# Patient Record
Sex: MALE | Race: WHITE | NOT HISPANIC OR LATINO | Employment: OTHER | URBAN - METROPOLITAN AREA
[De-identification: names, ages, dates, MRNs, and addresses within clinical notes are randomized per-mention and may not be internally consistent; named-entity substitution may affect disease eponyms.]

---

## 2018-07-05 RX ORDER — CHLORAL HYDRATE 500 MG
CAPSULE ORAL
COMMUNITY

## 2018-07-05 RX ORDER — ATORVASTATIN CALCIUM 40 MG/1
40 TABLET, FILM COATED ORAL DAILY
COMMUNITY
End: 2018-07-12

## 2018-07-05 RX ORDER — GABAPENTIN 300 MG/1
300 CAPSULE ORAL 3 TIMES DAILY
COMMUNITY

## 2018-07-05 RX ORDER — VALSARTAN 160 MG/1
160 TABLET ORAL DAILY
COMMUNITY
End: 2019-10-23

## 2018-07-05 RX ORDER — FENOFIBRATE 145 MG/1
145 TABLET, COATED ORAL DAILY
COMMUNITY

## 2018-07-05 RX ORDER — ATENOLOL 50 MG/1
50 TABLET ORAL DAILY
COMMUNITY

## 2018-07-12 ENCOUNTER — OFFICE VISIT (OUTPATIENT)
Dept: NEUROSURGERY | Facility: CLINIC | Age: 48
End: 2018-07-12

## 2018-07-12 VITALS
BODY MASS INDEX: 26.49 KG/M2 | WEIGHT: 213 LBS | TEMPERATURE: 98.6 F | SYSTOLIC BLOOD PRESSURE: 118 MMHG | HEIGHT: 75 IN | DIASTOLIC BLOOD PRESSURE: 64 MMHG

## 2018-07-12 DIAGNOSIS — R07.9 CHEST PAIN, UNSPECIFIED TYPE: ICD-10-CM

## 2018-07-12 DIAGNOSIS — M51.34 DDD (DEGENERATIVE DISC DISEASE), THORACIC: ICD-10-CM

## 2018-07-12 DIAGNOSIS — M50.20 HERNIATED CERVICAL DISC: ICD-10-CM

## 2018-07-12 DIAGNOSIS — G54.0 BRACHIAL PLEXOPATHY: Primary | ICD-10-CM

## 2018-07-12 DIAGNOSIS — M48.02 SPINAL STENOSIS IN CERVICAL REGION: ICD-10-CM

## 2018-07-12 DIAGNOSIS — M54.9 MID BACK PAIN: ICD-10-CM

## 2018-07-12 PROCEDURE — 99203 OFFICE O/P NEW LOW 30 MIN: CPT | Performed by: NEUROLOGICAL SURGERY

## 2018-07-12 NOTE — PROGRESS NOTES
NAME: NAY LEE   DOS: 2018  : 1970  PCP: Saulo Thao MD    Chief Complaint:  Left axillary pain chest pain  Chief Complaint   Patient presents with   • Arm Pain   • Neck Pain   • Chest Pain       History of Present Illness:  48 y.o. male   Is a 48-year-old diabetic male with a history of interesting left arm pain finger numbness as well as some left-sided anterior chest wall pain.  He denies any problems with weakness of his upper and lower extremities the pain is been ever present since at least May of this year where he got an MRI of the cervical spine and was apparently told that he needed surgery.  During his workup given the severity of his left chest wall pain his smoking status and diabetes he underwent a cardiac catheterization as well as workup for chest cancer these were reportedly unremarkable.  His arm pain has since abated he denies any weakness changes he occasionally got some popping in his neck but the majority of his pain centers around the T3 perhaps T2 dermatomal he's here for evaluation the  He was told by prednisone he's currently on Neurontin  PMHX  Allergies:  No Known Allergies  Medications    Current Outpatient Prescriptions:   •  atenolol (TENORMIN) 50 MG tablet, Take 50 mg by mouth Daily., Disp: , Rfl:   •  fenofibrate (TRICOR) 145 MG tablet, Take 145 mg by mouth Daily., Disp: , Rfl:   •  gabapentin (NEURONTIN) 300 MG capsule, Take 300 mg by mouth 3 (Three) Times a Day., Disp: , Rfl:   •  metFORMIN (GLUCOPHAGE) 1000 MG tablet, Take 1,000 mg by mouth 2 (Two) Times a Day With Meals., Disp: , Rfl:   •  Omega-3 Fatty Acids (FISH OIL) 1000 MG capsule capsule, Take  by mouth Daily With Breakfast., Disp: , Rfl:   •  SILDENAFIL CITRATE PO, Take 50 mg by mouth., Disp: , Rfl:   •  valsartan (DIOVAN) 160 MG tablet, Take 160 mg by mouth Daily., Disp: , Rfl:   Past Medical History:  Past Medical History:   Diagnosis Date   • Allergic rhinitis    • Anxiety    •  Diabetes mellitus (CMS/HCC)    • ED (erectile dysfunction)    • Hypertension    • Hypertriglyceridemia      Past Surgical History:  Past Surgical History:   Procedure Laterality Date   • HERNIA REPAIR       Social Hx:  Social History   Substance Use Topics   • Smoking status: Current Every Day Smoker     Types: Cigarettes   • Smokeless tobacco: Never Used   • Alcohol use No     Family Hx:  Family History   Problem Relation Age of Onset   • No Known Problems Mother    • No Known Problems Father      Review of Systems:        Review of Systems   Constitutional: Positive for fatigue. Negative for activity change, appetite change, chills, diaphoresis, fever and unexpected weight change.   HENT: Negative for congestion, dental problem, drooling, ear discharge, ear pain, facial swelling, hearing loss, mouth sores, nosebleeds, postnasal drip, rhinorrhea, sinus pressure, sneezing, sore throat, tinnitus, trouble swallowing and voice change.    Eyes: Negative for photophobia, pain, discharge, redness, itching and visual disturbance.   Respiratory: Negative for apnea, cough, choking, chest tightness, shortness of breath, wheezing and stridor.    Cardiovascular: Negative for chest pain, palpitations and leg swelling.   Gastrointestinal: Negative for abdominal distention, abdominal pain, anal bleeding, blood in stool, constipation, diarrhea, nausea, rectal pain and vomiting.   Endocrine: Negative for cold intolerance, heat intolerance, polydipsia, polyphagia and polyuria.   Genitourinary: Negative for decreased urine volume, difficulty urinating, dysuria, enuresis, flank pain, frequency, genital sores, hematuria and urgency.   Musculoskeletal: Positive for arthralgias, back pain, myalgias, neck pain and neck stiffness. Negative for gait problem and joint swelling.   Skin: Negative for color change, pallor, rash and wound.   Allergic/Immunologic: Negative for environmental allergies, food allergies and immunocompromised state.    Neurological: Negative for dizziness, tremors, seizures, syncope, facial asymmetry, speech difficulty, weakness, light-headedness, numbness and headaches.   Hematological: Negative for adenopathy. Does not bruise/bleed easily.   Psychiatric/Behavioral: Negative for agitation, behavioral problems, confusion, decreased concentration, dysphoric mood, hallucinations, self-injury, sleep disturbance and suicidal ideas. The patient is not nervous/anxious and is not hyperactive.     I have reviewed this note template and all pertinent parts of the review of systems social, family history, surgical history and medication list        Physical Examination:  Vitals:    07/12/18 1614   BP: 118/64   Temp: 98.6 °F (37 °C)      General Appearance:   Well developed, well nourished, well groomed, alert, and cooperative.  Neurological examination:  Neurologic Exam    Vital signs were reviewed and documented in the chart  Patient appeared in good neurologic function with normal comprehension fluent speech  Mood and affect are normal  Sense of smell deferred    Pupils symmetric equally reactive funduscopic exam not visualized   Visual fields intact to confrontation  Extraocular movements intact  Face motor function is symmetric  Facial sensations normal  Hearing intact to finger rub hearing intact to finger rub  Tongue is midline  Palate symmetric  Swallowing normal  Shoulder shrug normal    Muscle bulk and tone normal  5 out of 5 strength no motor drift  Gait normal intact  Negative Romberg  No clonus long tract signs or myelopathy    Reflexes symmetric  No edema noted and extremities skin appears normal    Straight leg raise sign absent  No signs of intrinsic hip dysfunction  Back is without any lesions or abnormality  Feet are warm and well perfused  He has no suspended thoracic sensory level he has no vibratory sensation issues    Review of Imaging/DATA:  His MRI of the cervical spine is relatively impressive he does have C5 6 C6  7 disc disease there is some cord contouring there is no evidence of signal change most of his disease is in the right intraforaminal area however there is left central disc bulge  Diagnoses/Plan:    Mr. Villalpando is a 48 y.o. male   The gentleman presents with mostly thoracic complaints of T2 perhaps T3 dermatomal distribution pain he denies any rashes he doesn't have any paraspinal tenderness in all of his radicular complaints are gone I think it's prudent to get a thoracic MRI it may be that he had had a shingle outbreak or something or perhaps this is atypical radicular preferred pain but it is unusual given its location nonetheless.  I don't necessarily think given the absence of myelopathy that he requires urgent decompression and I explained to him I cautioned him about fall risk etc. I think it's reasonable to monitor him for the next month or so and see him back with an MRI of the thoracic and repeat cervical spine his film is 3 months old.  We can contemplate an anterior cervical discectomy depending the results of this follow-up

## 2018-07-19 ENCOUNTER — APPOINTMENT (OUTPATIENT)
Dept: MRI IMAGING | Facility: HOSPITAL | Age: 48
End: 2018-07-19
Attending: NEUROLOGICAL SURGERY

## 2018-07-20 ENCOUNTER — HOSPITAL ENCOUNTER (OUTPATIENT)
Dept: MRI IMAGING | Facility: HOSPITAL | Age: 48
Discharge: HOME | End: 2018-07-20
Attending: NEUROLOGICAL SURGERY | Admitting: NEUROLOGICAL SURGERY

## 2018-07-20 ENCOUNTER — HOSPITAL ENCOUNTER (OUTPATIENT)
Dept: MRI IMAGING | Facility: HOSPITAL | Age: 48
Discharge: HOME | End: 2018-07-20
Attending: NEUROLOGICAL SURGERY

## 2018-07-20 DIAGNOSIS — M48.02 SPINAL STENOSIS IN CERVICAL REGION: ICD-10-CM

## 2018-07-20 DIAGNOSIS — M54.9 MID BACK PAIN: ICD-10-CM

## 2018-07-20 DIAGNOSIS — R07.9 CHEST PAIN, UNSPECIFIED TYPE: ICD-10-CM

## 2018-07-20 DIAGNOSIS — M51.34 DDD (DEGENERATIVE DISC DISEASE), THORACIC: ICD-10-CM

## 2018-07-20 DIAGNOSIS — M50.20 HERNIATED CERVICAL DISC: ICD-10-CM

## 2018-07-20 PROCEDURE — 72141 MRI NECK SPINE W/O DYE: CPT

## 2018-07-20 PROCEDURE — 72146 MRI CHEST SPINE W/O DYE: CPT

## 2018-07-23 ENCOUNTER — OFFICE VISIT (OUTPATIENT)
Dept: NEUROSURGERY | Facility: CLINIC | Age: 48
End: 2018-07-23

## 2018-07-23 VITALS — RESPIRATION RATE: 17 BRPM | OXYGEN SATURATION: 98 % | HEIGHT: 75 IN

## 2018-07-23 DIAGNOSIS — M53.9 MULTILEVEL DEGENERATIVE DISC DISEASE: Primary | ICD-10-CM

## 2018-07-23 PROBLEM — M50.30 DDD (DEGENERATIVE DISC DISEASE), CERVICAL: Status: ACTIVE | Noted: 2018-07-23

## 2018-07-23 PROCEDURE — 99214 OFFICE O/P EST MOD 30 MIN: CPT | Performed by: NEUROLOGICAL SURGERY

## 2018-07-23 RX ORDER — VALSARTAN 80 MG/1
TABLET ORAL
COMMUNITY
Start: 2018-07-21 | End: 2019-10-23

## 2019-10-23 ENCOUNTER — OFFICE VISIT (OUTPATIENT)
Dept: PULMONOLOGY | Facility: CLINIC | Age: 49
End: 2019-10-23

## 2019-10-23 VITALS
HEIGHT: 74 IN | DIASTOLIC BLOOD PRESSURE: 82 MMHG | WEIGHT: 207 LBS | HEART RATE: 80 BPM | SYSTOLIC BLOOD PRESSURE: 130 MMHG | OXYGEN SATURATION: 98 % | BODY MASS INDEX: 26.56 KG/M2 | TEMPERATURE: 98.6 F

## 2019-10-23 DIAGNOSIS — R91.8 MULTIPLE PULMONARY NODULES: Primary | ICD-10-CM

## 2019-10-23 DIAGNOSIS — R07.9 CHEST PAIN, UNSPECIFIED TYPE: ICD-10-CM

## 2019-10-23 DIAGNOSIS — Z72.0 TOBACCO USE: ICD-10-CM

## 2019-10-23 DIAGNOSIS — J43.2 CENTRILOBULAR EMPHYSEMA (HCC): ICD-10-CM

## 2019-10-23 PROCEDURE — 99406 BEHAV CHNG SMOKING 3-10 MIN: CPT | Performed by: INTERNAL MEDICINE

## 2019-10-23 PROCEDURE — 94010 BREATHING CAPACITY TEST: CPT | Performed by: INTERNAL MEDICINE

## 2019-10-23 PROCEDURE — 99204 OFFICE O/P NEW MOD 45 MIN: CPT | Performed by: INTERNAL MEDICINE

## 2019-10-23 PROCEDURE — 94729 DIFFUSING CAPACITY: CPT | Performed by: INTERNAL MEDICINE

## 2019-10-23 PROCEDURE — 94726 PLETHYSMOGRAPHY LUNG VOLUMES: CPT | Performed by: INTERNAL MEDICINE

## 2019-10-23 RX ORDER — LOSARTAN POTASSIUM 50 MG/1
TABLET ORAL
COMMUNITY
Start: 2019-09-13

## 2019-10-23 NOTE — PROGRESS NOTES
New Patient Pulmonary Office Visit      Patient Name: Fitz Villalpando    Referring Physician: Russ Lofton MD    Chief Complaint:    Chief Complaint   Patient presents with   • Lung Nodule       History of Present Illness: Fitz Villalpando is a 49 y.o. male who is here today to establish care with Pulmonary.  He has a past medical history significant for tobacco abuse, diabetes mellitus and pancreatitis.  He was referred to pulmonary with regards to a pulmonary nodule.  The patient notes that he was having left-sided chest pain that is been going on for over a year, and has had evaluation from cardiology with no signs of coronary artery disease.  He also had evaluation of neck pain that was considered to be possibly the cause of his pain in his left lower chest.  He states the pain is sharp in nature that radiates to the center of his chest and wraps around the left flank.  As part of the evaluation for causes of chest pain he had a CT of the chest which showed multiple small nodules in the left lower lobe with a left lower lobe granuloma.  All the nodules are noted to be less than 5 mm in size.  He denies any associated symptoms.  He states the pain has continued to be present despite current therapy, with Neurontin.  He is yet to find anything that completely relieves the pain.  He notes that he quit smoking 1 week ago.  He has no personal history of cancer.  He denies any recent fever, chills, abdominal pain, weight loss, or shortness of air.  He does note that he has a cough that is been present for many months with some mucus production, that is worse in the morning but after he gets going throughout his day he does not have any cough.    Review of Systems:   Review of Systems   Constitutional: Negative for activity change, appetite change, chills and diaphoresis.   HENT: Negative for congestion, postnasal drip, sinus pressure and voice change.    Eyes: Negative for blurred vision.   Respiratory: Negative  for cough, shortness of breath and wheezing.    Cardiovascular: Positive for chest pain and leg swelling.   Gastrointestinal: Negative for abdominal pain.   Musculoskeletal: Negative for myalgias.   Skin: Negative for color change and dry skin.   Allergic/Immunologic: Negative for environmental allergies.   Neurological: Negative for weakness and confusion.   Hematological: Negative for adenopathy.   Psychiatric/Behavioral: Negative for sleep disturbance and depressed mood.       Past Medical History:   Past Medical History:   Diagnosis Date   • Allergic rhinitis    • Anxiety    • Diabetes mellitus (CMS/HCC)    • ED (erectile dysfunction)    • Hypertension    • Hypertriglyceridemia        Past Surgical History:   Past Surgical History:   Procedure Laterality Date   • HERNIA REPAIR         Family History:   Family History   Problem Relation Age of Onset   • Heart failure Mother    • Parkinsonism Father    • Multiple sclerosis Sister    • Cancer Sister        Social History:   Social History     Socioeconomic History   • Marital status:      Spouse name: Not on file   • Number of children: Not on file   • Years of education: Not on file   • Highest education level: Not on file   Tobacco Use   • Smoking status: Current Every Day Smoker     Packs/day: 1.50     Years: 30.00     Pack years: 45.00     Types: Cigarettes   • Smokeless tobacco: Never Used   Substance and Sexual Activity   • Alcohol use: No   • Drug use: No   • Sexual activity: Defer       Medications:     Current Outpatient Medications:   •  metFORMIN (GLUCOPHAGE) 1000 MG tablet, Take 1,000 mg by mouth 2 (Two) Times a Day With Meals., Disp: , Rfl:   •  Omega-3 Fatty Acids (FISH OIL) 1000 MG capsule capsule, Take  by mouth Daily With Breakfast., Disp: , Rfl:   •  SILDENAFIL CITRATE PO, Take 50 mg by mouth., Disp: , Rfl:   •  albuterol (PROAIR RESPICLICK) 108 (90 Base) MCG/ACT inhaler, Inhale 2 puffs Every 6 (Six) Hours As Needed for Wheezing., Disp: 1  "inhaler, Rfl: 11  •  atenolol (TENORMIN) 50 MG tablet, Take 50 mg by mouth Daily., Disp: , Rfl:   •  fenofibrate (TRICOR) 145 MG tablet, Take 145 mg by mouth Daily., Disp: , Rfl:   •  gabapentin (NEURONTIN) 300 MG capsule, Take 300 mg by mouth 3 (Three) Times a Day., Disp: , Rfl:   •  losartan (COZAAR) 50 MG tablet, , Disp: , Rfl:     Allergies:   No Known Allergies    Physical Exam:  Vital Signs:   Vitals:    10/23/19 0821   BP: 130/82   Pulse: 80   Temp: 98.6 °F (37 °C)   SpO2: 98%  Comment: room air at rest   Weight: 93.9 kg (207 lb)   Height: 188 cm (74\")       Physical Exam   Constitutional: He is oriented to person, place, and time. He appears well-developed and well-nourished.   HENT:   Head: Normocephalic and atraumatic.   Nose: Nose normal.   Mouth/Throat: Oropharynx is clear and moist.   Eyes: Conjunctivae are normal. Pupils are equal, round, and reactive to light.   Neck: Normal range of motion. Neck supple.   Cardiovascular: Normal rate and regular rhythm. Exam reveals no gallop and no friction rub.   No murmur heard.  Pulmonary/Chest: Effort normal and breath sounds normal. He has no wheezes. He has no rales.   Abdominal: Soft. Bowel sounds are normal.   Musculoskeletal: Normal range of motion. He exhibits no edema.   Neurological: He is alert and oriented to person, place, and time.   Skin: Skin is warm and dry. No erythema.   Psychiatric: He has a normal mood and affect. His behavior is normal.   Nursing note and vitals reviewed.      Results Review:   - I personally reviewed the pts imaging from 10/2/2019 which showed multiple left lower lobe nodules with associated granuloma in the left lower lobe.  Per the report this was compared to the CT abdomen pelvis from January 2019 in which the small nodules were noted that as well and they have remained stable in size.  I do not have the CT scan of the abdomen pelvis from January 2019 to personally compare.  - I personally reviewed the pts PFT from " 10/23/2019 shows mild obstruction without restriction and normal DLCO.    Assessment / Plan:    Multiple pulmonary nodules  -I reviewed the patient's charts and outside records as noted with the CT scan above, I explained to the patient there is a broad differential for multiple pulmonary nodules, and that with regards to the Fleischner guidelines we will be able to follow-up within 1 year and we would follow nodules for 2 years in total.  I informed him that given his high risk nature and tobacco history that I would be unable to tell him if any of these could potentially be cancer although currently with all of them being less than 5 mm it makes cancer less likely as well there is multiple other causes.  That would include infectious causes or inflammatory given his history of pancreatitis.  -We will plan to repeat a CT scan 1 year from now, if this nodules are again stable at 1 year, that would complete 2 years of following and no further following would be necessary.    Centrilobular emphysema (CMS/HCC)  -Patient was noted to have emphysema, given the fact he also has a morning productive cough that is been present for a few months this is consistent with a diagnosis of COPD.  He would be gold stage II class a and therefore only therapy indicated would be an albuterol inhaler.  We discussed the benefit of using albuterol inhaler and ultimately decided that he would prefer to have one to keep in his pocket.  -Albuterol inhaler ordered to be used up to 6 times per day, I counseled him that if he were using it more than once per week he would need reevaluation as we would likely place him on a maintenance inhaler.    Chest pain, unspecified type  -Non-cardiac in nature unclear etiology, although I do not feel the pulmonary nodules or COPD would explain the left-sided chest pain that he is been having.  He should continue to have work-up per his primary care doctor.    Tobacco use  -In this visit the patient was  advised to stop smoking and was offered tobacco cessation measures and resources, including NRT and/or medication intervention. At least 5 minutes was spent on face-to-face counseling regarding smoking cessation.    Follow Up:   Return in about 1 year (around 10/23/2020) for CT Chest with next visit.     RUTHANN Cuadra,   Pulmonary and Critical Care Medicine  Note Electronically Signed    Please note that portions of this note may have been completed with a voice recognition program. Efforts were made to edit the dictations, but occasionally words are mistranscribed.

## 2020-10-28 ENCOUNTER — DOCUMENTATION (OUTPATIENT)
Dept: PULMONOLOGY | Facility: CLINIC | Age: 50
End: 2020-10-28

## 2020-10-28 NOTE — PROGRESS NOTES
Certified letter sent to patient reminding him to schedule his CT scan of the chest that was due in October 2020.

## 2021-01-20 ENCOUNTER — HOSPITAL ENCOUNTER (EMERGENCY)
Facility: HOSPITAL | Age: 51
Discharge: HOME | End: 2021-01-20
Attending: EMERGENCY MEDICINE | Admitting: EMERGENCY MEDICINE

## 2021-01-20 VITALS
HEIGHT: 75 IN | RESPIRATION RATE: 16 BRPM | BODY MASS INDEX: 26.11 KG/M2 | HEART RATE: 83 BPM | DIASTOLIC BLOOD PRESSURE: 104 MMHG | SYSTOLIC BLOOD PRESSURE: 187 MMHG | TEMPERATURE: 98.2 F | WEIGHT: 210 LBS | OXYGEN SATURATION: 95 %

## 2021-01-20 DIAGNOSIS — L72.3 SEBACEOUS CYST: Primary | ICD-10-CM

## 2021-01-20 DIAGNOSIS — N48.1 BALANITIS: ICD-10-CM

## 2021-01-20 LAB
ALBUMIN SERPL-MCNC: 3.6 G/DL (ref 3.5–5.2)
ALBUMIN/GLOB SERPL: 0.8 G/DL
ALP SERPL-CCNC: 108 U/L (ref 39–117)
ALT SERPL W P-5'-P-CCNC: 147 U/L (ref 1–41)
ANION GAP SERPL CALCULATED.3IONS-SCNC: 11 MMOL/L (ref 5–15)
AST SERPL-CCNC: 65 U/L (ref 1–40)
BASOPHILS # BLD AUTO: 0.04 10*3/MM3 (ref 0–0.2)
BASOPHILS NFR BLD AUTO: 0.5 % (ref 0–1.5)
BILIRUB SERPL-MCNC: 0.3 MG/DL (ref 0–1.2)
BUN SERPL-MCNC: 14 MG/DL (ref 6–20)
BUN/CREAT SERPL: 14 (ref 7–25)
CALCIUM SPEC-SCNC: 9.2 MG/DL (ref 8.6–10.5)
CHLORIDE SERPL-SCNC: 98 MMOL/L (ref 98–107)
CO2 SERPL-SCNC: 24 MMOL/L (ref 22–29)
CREAT SERPL-MCNC: 1 MG/DL (ref 0.76–1.27)
D-LACTATE SERPL-SCNC: 1.5 MMOL/L (ref 0.5–2)
DEPRECATED RDW RBC AUTO: 43.7 FL (ref 37–54)
EOSINOPHIL # BLD AUTO: 0.19 10*3/MM3 (ref 0–0.4)
EOSINOPHIL NFR BLD AUTO: 2.5 % (ref 0.3–6.2)
ERYTHROCYTE [DISTWIDTH] IN BLOOD BY AUTOMATED COUNT: 12.4 % (ref 12.3–15.4)
GFR SERPL CREATININE-BSD FRML MDRD: 80 ML/MIN/1.73
GLOBULIN UR ELPH-MCNC: 4.6 GM/DL
GLUCOSE SERPL-MCNC: 294 MG/DL (ref 65–99)
HCT VFR BLD AUTO: 45.8 % (ref 37.5–51)
HGB BLD-MCNC: 15.2 G/DL (ref 13–17.7)
IMM GRANULOCYTES # BLD AUTO: 0.02 10*3/MM3 (ref 0–0.05)
IMM GRANULOCYTES NFR BLD AUTO: 0.3 % (ref 0–0.5)
LYMPHOCYTES # BLD AUTO: 2.79 10*3/MM3 (ref 0.7–3.1)
LYMPHOCYTES NFR BLD AUTO: 37.3 % (ref 19.6–45.3)
MCH RBC QN AUTO: 31.5 PG (ref 26.6–33)
MCHC RBC AUTO-ENTMCNC: 33.2 G/DL (ref 31.5–35.7)
MCV RBC AUTO: 95 FL (ref 79–97)
MONOCYTES # BLD AUTO: 0.89 10*3/MM3 (ref 0.1–0.9)
MONOCYTES NFR BLD AUTO: 11.9 % (ref 5–12)
NEUTROPHILS NFR BLD AUTO: 3.55 10*3/MM3 (ref 1.7–7)
NEUTROPHILS NFR BLD AUTO: 47.5 % (ref 42.7–76)
NRBC BLD AUTO-RTO: 0 /100 WBC (ref 0–0.2)
PLATELET # BLD AUTO: 119 10*3/MM3 (ref 140–450)
PMV BLD AUTO: 9.6 FL (ref 6–12)
POTASSIUM SERPL-SCNC: 4.4 MMOL/L (ref 3.5–5.2)
PROT SERPL-MCNC: 8.2 G/DL (ref 6–8.5)
RBC # BLD AUTO: 4.82 10*6/MM3 (ref 4.14–5.8)
SODIUM SERPL-SCNC: 133 MMOL/L (ref 136–145)
WBC # BLD AUTO: 7.48 10*3/MM3 (ref 3.4–10.8)

## 2021-01-20 PROCEDURE — 85025 COMPLETE CBC W/AUTO DIFF WBC: CPT | Performed by: PHYSICIAN ASSISTANT

## 2021-01-20 PROCEDURE — 80053 COMPREHEN METABOLIC PANEL: CPT | Performed by: PHYSICIAN ASSISTANT

## 2021-01-20 PROCEDURE — 96365 THER/PROPH/DIAG IV INF INIT: CPT

## 2021-01-20 PROCEDURE — 83605 ASSAY OF LACTIC ACID: CPT | Performed by: PHYSICIAN ASSISTANT

## 2021-01-20 PROCEDURE — 25010000002 CEFTRIAXONE PER 250 MG: Performed by: PHYSICIAN ASSISTANT

## 2021-01-20 PROCEDURE — 87040 BLOOD CULTURE FOR BACTERIA: CPT | Performed by: PHYSICIAN ASSISTANT

## 2021-01-20 PROCEDURE — 99283 EMERGENCY DEPT VISIT LOW MDM: CPT

## 2021-01-20 RX ORDER — CLOTRIMAZOLE AND BETAMETHASONE DIPROPIONATE 10; .64 MG/G; MG/G
CREAM TOPICAL EVERY 12 HOURS SCHEDULED
Status: DISCONTINUED | OUTPATIENT
Start: 2021-01-20 | End: 2021-01-21 | Stop reason: HOSPADM

## 2021-01-20 RX ORDER — LIDOCAINE HYDROCHLORIDE 20 MG/ML
10 INJECTION, SOLUTION INFILTRATION; PERINEURAL ONCE
Status: COMPLETED | OUTPATIENT
Start: 2021-01-20 | End: 2021-01-20

## 2021-01-20 RX ORDER — CLOTRIMAZOLE AND BETAMETHASONE DIPROPIONATE 10; .64 MG/G; MG/G
CREAM TOPICAL 2 TIMES DAILY
Qty: 15 G | Refills: 0 | Status: SHIPPED | OUTPATIENT
Start: 2021-01-20 | End: 2021-01-25

## 2021-01-20 RX ADMIN — LIDOCAINE HYDROCHLORIDE 10 ML: 20 INJECTION, SOLUTION INFILTRATION; PERINEURAL at 21:37

## 2021-01-20 RX ADMIN — CLOTRIMAZOLE AND BETAMETHASONE DIPROPIONATE: 10; .5 CREAM TOPICAL at 23:03

## 2021-01-20 RX ADMIN — CEFTRIAXONE SODIUM 1 G: 1 INJECTION, POWDER, FOR SOLUTION INTRAMUSCULAR; INTRAVENOUS at 23:03

## 2021-01-21 NOTE — DISCHARGE INSTRUCTIONS
Symptomatic care is recommended. Take all medications as prescribed and instructed.  Take and complete full course of antibiotics as prescribed by your primary care physician.  Follow up with your primary care and general surgery as directed or return to Emergency Department with worsening of symptoms.

## 2021-01-21 NOTE — ED PROVIDER NOTES
Subjective   Patient is a 50-year-old male presents emergency room today with complaints of a cyst in his groin and area of erythema at the tip of his penis.  Patient states that he has been working with his primary care physician on treatment of his groin cyst.  He states that he recently completed his third round of antibiotics and that he was scheduled to follow-up with urology and general surgery.  He states that he noticed increased swelling in the area 2 days ago and feels like the cyst is back and needs to be drained again.  He contacted his primary care physician who instructed him to report to the ER for evaluation as he could not get patient to see a specialist in the near future.  Patient reports in addition to the cyst in his groin he noticed over the last 2 days that the tip of his penis was tender and red.  He shares he applied some antibiotic ointment but that it seems to have worsened over the last 2 days.  Patient denies any sexual contacts and reports he is single and not sexually active.          Review of Systems   Constitutional: Negative for activity change, chills, fatigue and fever.   Respiratory: Negative for cough and shortness of breath.    Gastrointestinal: Negative for abdominal pain, constipation, diarrhea, nausea and vomiting.   Genitourinary: Positive for genital sores and penile pain. Negative for difficulty urinating, scrotal swelling and testicular pain.   Musculoskeletal: Negative.    Skin: Positive for color change and wound.   All other systems reviewed and are negative.      Past Medical History:   Diagnosis Date   • Allergic rhinitis    • Anxiety    • Diabetes mellitus (CMS/HCC)    • ED (erectile dysfunction)    • Hypertension    • Hypertriglyceridemia        No Known Allergies    Past Surgical History:   Procedure Laterality Date   • HERNIA REPAIR         Family History   Problem Relation Age of Onset   • Heart failure Mother    • Parkinsonism Father    • Multiple sclerosis  Sister    • Cancer Sister        Social History     Socioeconomic History   • Marital status:      Spouse name: Not on file   • Number of children: Not on file   • Years of education: Not on file   • Highest education level: Not on file   Tobacco Use   • Smoking status: Current Every Day Smoker     Packs/day: 1.50     Years: 30.00     Pack years: 45.00     Types: Cigarettes   • Smokeless tobacco: Never Used   Substance and Sexual Activity   • Alcohol use: No   • Drug use: No   • Sexual activity: Defer           Objective   Physical Exam  Vitals signs and nursing note reviewed.   Constitutional:       General: He is not in acute distress.     Appearance: Normal appearance. He is well-developed. He is not ill-appearing or toxic-appearing.   HENT:      Head: Normocephalic and atraumatic.      Mouth/Throat:      Mouth: Mucous membranes are moist.   Eyes:      Extraocular Movements: Extraocular movements intact.   Neck:      Musculoskeletal: Normal range of motion and neck supple.   Cardiovascular:      Rate and Rhythm: Normal rate and regular rhythm.   Pulmonary:      Effort: Pulmonary effort is normal. No respiratory distress.   Abdominal:      General: There is no distension.      Palpations: Abdomen is soft.      Tenderness: There is no abdominal tenderness.   Genitourinary:     Penis: Circumcised. Erythema and tenderness present.       Scrotum/Testes: Normal.          Comments: 2 cm x 2 cm area fluid collection with surrounding erythema and tenderness to palpation consistent with cyst.     Erythema with skin breakdown and wound at distal portion of penis.  Musculoskeletal: Normal range of motion.         General: No deformity.   Skin:     General: Skin is warm and dry.      Findings: Erythema present.   Neurological:      General: No focal deficit present.      Mental Status: He is alert.   Psychiatric:         Mood and Affect: Mood normal.         Behavior: Behavior normal.         Thought Content: Thought  content normal.         Judgment: Judgment normal.         Incision & Drainage    Date/Time: 1/20/2021 10:26 PM  Performed by: Darryl Sullivan PA  Authorized by: Jonathan Bose DO     Consent:     Consent obtained:  Verbal    Consent given by:  Patient    Risks discussed:  Bleeding, incomplete drainage, pain and infection    Alternatives discussed:  Delayed treatment and referral  Location:     Type:  Cyst    Size:  2cm x 2cm    Location:  Lower extremity    Lower extremity location:  Leg    Leg location:  L upper leg  Pre-procedure details:     Skin preparation:  Betadine  Anesthesia (see MAR for exact dosages):     Anesthesia method:  Local infiltration    Local anesthetic:  Lidocaine 2% w/o epi  Procedure type:     Complexity:  Simple  Procedure details:     Needle aspiration: no      Incision types:  Stab incision    Incision depth:  Subcutaneous    Scalpel blade:  11    Wound management:  Probed and deloculated, irrigated with saline and extensive cleaning    Drainage:  Purulent    Drainage amount:  Moderate    Wound treatment:  Wound left open    Packing materials:  None  Post-procedure details:     Patient tolerance of procedure:  Tolerated well, no immediate complications               ED Course  ED Course as of Jan 20 2230 Wed Jan 20, 2021 2228 Patient presents to ED for evaluation of cyst in his left groin and redness and irritation to the tip of his penis.  Left groin consistent with sebaceous cyst and penile irritation consistent with balanitis.  I&D performed on cysts as documented in procedural note.  Lotrisone applied to area of inflammation at head of penis.  Patient prescribed antibiotic therapy by primary care that will be initiated on discharge and patient given 1 dose of IV antibiotics while in the emergency department.  Patient tolerated procedure well.  No acute or emergent abnormalities appreciated on labs.  Patient afebrile, nontoxic appearance and vital signs stable.  Patient has  outpatient follow-up already scheduled tomorrow with general surgery and will keep this appointment.  Patient also instructed on continued outpatient follow-up with his primary care physician, given return precautions and showed understanding.    [JG]      ED Course User Index  [JG] Darryl Sullivan, PA      Recent Results (from the past 24 hour(s))   Comprehensive Metabolic Panel    Collection Time: 01/20/21  9:13 PM    Specimen: Blood   Result Value Ref Range    Glucose 294 (H) 65 - 99 mg/dL    BUN 14 6 - 20 mg/dL    Creatinine 1.00 0.76 - 1.27 mg/dL    Sodium 133 (L) 136 - 145 mmol/L    Potassium 4.4 3.5 - 5.2 mmol/L    Chloride 98 98 - 107 mmol/L    CO2 24.0 22.0 - 29.0 mmol/L    Calcium 9.2 8.6 - 10.5 mg/dL    Total Protein 8.2 6.0 - 8.5 g/dL    Albumin 3.60 3.50 - 5.20 g/dL    ALT (SGPT) 147 (H) 1 - 41 U/L    AST (SGOT) 65 (H) 1 - 40 U/L    Alkaline Phosphatase 108 39 - 117 U/L    Total Bilirubin 0.3 0.0 - 1.2 mg/dL    eGFR Non African Amer 80 >60 mL/min/1.73    Globulin 4.6 gm/dL    A/G Ratio 0.8 g/dL    BUN/Creatinine Ratio 14.0 7.0 - 25.0    Anion Gap 11.0 5.0 - 15.0 mmol/L   Lactic Acid, Plasma    Collection Time: 01/20/21  9:13 PM    Specimen: Blood   Result Value Ref Range    Lactate 1.5 0.5 - 2.0 mmol/L   CBC Auto Differential    Collection Time: 01/20/21  9:13 PM    Specimen: Blood   Result Value Ref Range    WBC 7.48 3.40 - 10.80 10*3/mm3    RBC 4.82 4.14 - 5.80 10*6/mm3    Hemoglobin 15.2 13.0 - 17.7 g/dL    Hematocrit 45.8 37.5 - 51.0 %    MCV 95.0 79.0 - 97.0 fL    MCH 31.5 26.6 - 33.0 pg    MCHC 33.2 31.5 - 35.7 g/dL    RDW 12.4 12.3 - 15.4 %    RDW-SD 43.7 37.0 - 54.0 fl    MPV 9.6 6.0 - 12.0 fL    Platelets 119 (L) 140 - 450 10*3/mm3    Neutrophil % 47.5 42.7 - 76.0 %    Lymphocyte % 37.3 19.6 - 45.3 %    Monocyte % 11.9 5.0 - 12.0 %    Eosinophil % 2.5 0.3 - 6.2 %    Basophil % 0.5 0.0 - 1.5 %    Immature Grans % 0.3 0.0 - 0.5 %    Neutrophils, Absolute 3.55 1.70 - 7.00 10*3/mm3    Lymphocytes,  "Absolute 2.79 0.70 - 3.10 10*3/mm3    Monocytes, Absolute 0.89 0.10 - 0.90 10*3/mm3    Eosinophils, Absolute 0.19 0.00 - 0.40 10*3/mm3    Basophils, Absolute 0.04 0.00 - 0.20 10*3/mm3    Immature Grans, Absolute 0.02 0.00 - 0.05 10*3/mm3    nRBC 0.0 0.0 - 0.2 /100 WBC     Note: In addition to lab results from this visit, the labs listed above may include labs taken at another facility or during a different encounter within the last 24 hours. Please correlate lab times with ED admission and discharge times for further clarification of the services performed during this visit.    No orders to display     Vitals:    01/20/21 1910 01/20/21 2054   BP: (!) 187/104    BP Location: Left arm    Patient Position: Sitting    Pulse: 100    Resp: 16 16   Temp: 98.2 °F (36.8 °C)    TempSrc: Infrared    SpO2: 99% 98%   Weight: 95.3 kg (210 lb)    Height: 190.5 cm (75\")      Medications   cefTRIAXone (ROCEPHIN) 1 g/100 mL 0.9% NS (MBP) (has no administration in time range)   clotrimazole-betamethasone (LOTRISONE) 1-0.05 % cream (has no administration in time range)   lidocaine (XYLOCAINE) 2% injection 10 mL (10 mL Injection Given by Other 1/20/21 2137)     ECG/EMG Results (last 24 hours)     ** No results found for the last 24 hours. **        No orders to display          DISCHARGE    Patient discharged in stable condition.    Reviewed implications of results, diagnosis, meds, responsibility to follow up, warning signs and symptoms of possible worsening, potential complications and reasons to return to ER.    Patient/Family voiced understanding of above instructions.    Discussed plan for discharge, as there is no emergent indication for admission.  Pt/family is agreeable and understands need for follow up and possible repeat testing.  Pt/family is aware that discharge does not mean that nothing is wrong but that it indicates no emergency is currently present that requires admission and they must continue care with follow-up as " given below or with a physician of their choice.     FOLLOW-UP  Heladio Amaya MD  [address] [address] [phone]    Schedule an appointment as soon as possible for a visit   Follow-up with primary care and keep scheduled appointment for tomorrow with general surgery    [facility] Emergency Department  [address] [address] [phone]  Go to   If symptoms worsen         Medication List      New Prescriptions    clotrimazole-betamethasone 1-0.05 % cream  Commonly known as: LOTRISONE  Apply  topically to the appropriate area as directed 2 (Two) Times a Day for 10 doses.           Where to Get Your Medications      These medications were sent to [facility]/pharmacy #[id number] - [place], [place] - [address] - [phone]  - [phone]   [address] [address]    Hours: 24-hours Phone: [phone]   · clotrimazole-betamethasone 1-0.05 % cream                                     MDM  Number of Diagnoses or Management Options  Balanitis: new and requires workup  Sebaceous cyst: established and worsening     Amount and/or Complexity of Data Reviewed  Clinical lab tests: reviewed    Risk of Complications, Morbidity, and/or Mortality  Presenting problems: moderate  Diagnostic procedures: moderate  Management options: moderate    Patient Progress  Patient progress: improved      Final diagnoses:   Sebaceous cyst   Balanitis            Darryl Sullivan PA  01/20/21 1115

## 2021-01-25 LAB
BACTERIA SPEC AEROBE CULT: NORMAL
BACTERIA SPEC AEROBE CULT: NORMAL

## 2023-02-04 ENCOUNTER — CLAIMS CREATED FROM THE CLAIM WINDOW (OUTPATIENT)
Dept: URBAN - METROPOLITAN AREA MEDICAL CENTER 43 | Facility: MEDICAL CENTER | Age: 53
End: 2023-02-04
Payer: COMMERCIAL

## 2023-02-04 DIAGNOSIS — D69.6 THROMBOCYTOPENIA: ICD-10-CM

## 2023-02-04 DIAGNOSIS — B19.10 UNSPECIFIED VIRAL HEPATITIS B WITHOUT HEPATIC COMA: ICD-10-CM

## 2023-02-04 DIAGNOSIS — R19.5 ABNORMAL CONSISTENCY OF STOOL: ICD-10-CM

## 2023-02-04 DIAGNOSIS — R74.01 ABNORMAL/ELEVATED TRANSAMINASE (SGOT, AMINOTRANSFERASE): ICD-10-CM

## 2023-02-04 DIAGNOSIS — D50.0 IRON DEFICIENCY ANEMIA SECONDARY TO BLOOD LOSS (CHRONIC): ICD-10-CM

## 2023-02-04 PROCEDURE — 99204 OFFICE O/P NEW MOD 45 MIN: CPT | Performed by: INTERNAL MEDICINE

## 2023-02-04 PROCEDURE — 99254 IP/OBS CNSLTJ NEW/EST MOD 60: CPT | Performed by: INTERNAL MEDICINE

## 2023-02-05 ENCOUNTER — CLAIMS CREATED FROM THE CLAIM WINDOW (OUTPATIENT)
Dept: URBAN - METROPOLITAN AREA MEDICAL CENTER 43 | Facility: MEDICAL CENTER | Age: 53
End: 2023-02-05
Payer: COMMERCIAL

## 2023-02-05 DIAGNOSIS — R74.01 ABNORMAL/ELEVATED TRANSAMINASE (SGOT, AMINOTRANSFERASE): ICD-10-CM

## 2023-02-05 DIAGNOSIS — D50.0 IRON DEFICIENCY ANEMIA SECONDARY TO BLOOD LOSS (CHRONIC): ICD-10-CM

## 2023-02-05 DIAGNOSIS — K92.1 ACUTE MELENA: ICD-10-CM

## 2023-02-05 DIAGNOSIS — B19.10 UNSPECIFIED VIRAL HEPATITIS B WITHOUT HEPATIC COMA: ICD-10-CM

## 2023-02-05 PROCEDURE — 99214 OFFICE O/P EST MOD 30 MIN: CPT | Performed by: INTERNAL MEDICINE

## 2023-02-05 PROCEDURE — 99232 SBSQ HOSP IP/OBS MODERATE 35: CPT | Performed by: INTERNAL MEDICINE

## 2023-02-06 ENCOUNTER — CLAIMS CREATED FROM THE CLAIM WINDOW (OUTPATIENT)
Dept: URBAN - METROPOLITAN AREA MEDICAL CENTER 43 | Facility: MEDICAL CENTER | Age: 53
End: 2023-02-06
Payer: COMMERCIAL

## 2023-02-06 DIAGNOSIS — D50.0 IRON DEFICIENCY ANEMIA SECONDARY TO BLOOD LOSS (CHRONIC): ICD-10-CM

## 2023-02-06 DIAGNOSIS — I85.10 SECONDARY ESOPHAGEAL VARICES WITHOUT BLEEDING: ICD-10-CM

## 2023-02-06 DIAGNOSIS — K31.89 ACQUIRED DEFORMITY OF DUODENUM: ICD-10-CM

## 2023-02-06 DIAGNOSIS — K76.6 CLINICALLY SIGNIFICANT PORTAL HYPERTENSION: ICD-10-CM

## 2023-02-06 DIAGNOSIS — K44.9 DIAPHRAGMATIC HERNIA: ICD-10-CM

## 2023-02-06 DIAGNOSIS — K92.1 ACUTE MELENA: ICD-10-CM

## 2023-02-06 PROCEDURE — 43239 EGD BIOPSY SINGLE/MULTIPLE: CPT | Performed by: INTERNAL MEDICINE

## 2023-02-22 PROBLEM — 302215000 THROMBOCYTOPENIA: Status: ACTIVE | Noted: 2023-02-22

## 2023-02-22 PROBLEM — 14223005 OESOPHAGEAL VARICES WITHOUT BLEEDING: Status: ACTIVE | Noted: 2023-02-22

## 2025-04-15 ENCOUNTER — CONSULT (OUTPATIENT)
Dept: ONCOLOGY | Facility: CLINIC | Age: 55
End: 2025-04-15
Payer: COMMERCIAL

## 2025-04-15 ENCOUNTER — LAB (OUTPATIENT)
Dept: LAB | Facility: HOSPITAL | Age: 55
End: 2025-04-15
Payer: COMMERCIAL

## 2025-04-15 VITALS
SYSTOLIC BLOOD PRESSURE: 137 MMHG | HEIGHT: 74 IN | HEART RATE: 70 BPM | DIASTOLIC BLOOD PRESSURE: 73 MMHG | OXYGEN SATURATION: 98 % | TEMPERATURE: 97.5 F | BODY MASS INDEX: 25.41 KG/M2 | WEIGHT: 198 LBS

## 2025-04-15 DIAGNOSIS — D69.6 THROMBOCYTOPENIA: ICD-10-CM

## 2025-04-15 DIAGNOSIS — D69.6 THROMBOCYTOPENIA: Primary | ICD-10-CM

## 2025-04-15 LAB
ALBUMIN SERPL-MCNC: 4.6 G/DL (ref 3.5–5.2)
ALBUMIN/GLOB SERPL: 1 G/DL
ALP SERPL-CCNC: 75 U/L (ref 39–117)
ALT SERPL W P-5'-P-CCNC: 56 U/L (ref 1–41)
ANION GAP SERPL CALCULATED.3IONS-SCNC: 12 MMOL/L (ref 5–15)
APTT PPP: 31.2 SECONDS (ref 22–39)
AST SERPL-CCNC: 46 U/L (ref 1–40)
BASOPHILS # BLD AUTO: 0.01 10*3/MM3 (ref 0–0.2)
BASOPHILS NFR BLD AUTO: 0.2 % (ref 0–1.5)
BILIRUB SERPL-MCNC: 0.5 MG/DL (ref 0–1.2)
BUN SERPL-MCNC: 16 MG/DL (ref 6–20)
BUN/CREAT SERPL: 20.3 (ref 7–25)
CALCIUM SPEC-SCNC: 10.6 MG/DL (ref 8.6–10.5)
CHLORIDE SERPL-SCNC: 101 MMOL/L (ref 98–107)
CO2 SERPL-SCNC: 26 MMOL/L (ref 22–29)
CREAT SERPL-MCNC: 0.79 MG/DL (ref 0.76–1.27)
DEPRECATED RDW RBC AUTO: 48.9 FL (ref 37–54)
EGFRCR SERPLBLD CKD-EPI 2021: 106.2 ML/MIN/1.73
EOSINOPHIL # BLD AUTO: 0.17 10*3/MM3 (ref 0–0.4)
EOSINOPHIL NFR BLD AUTO: 3 % (ref 0.3–6.2)
ERYTHROCYTE [DISTWIDTH] IN BLOOD BY AUTOMATED COUNT: 16.1 % (ref 12.3–15.4)
FERRITIN SERPL-MCNC: 10.26 NG/ML (ref 30–400)
FIBRINOGEN PPP-MCNC: 354 MG/DL (ref 203–567)
FOLATE SERPL-MCNC: >20 NG/ML (ref 4.78–24.2)
GLOBULIN UR ELPH-MCNC: 4.4 GM/DL
GLUCOSE SERPL-MCNC: 146 MG/DL (ref 65–99)
HAPTOGLOB SERPL-MCNC: 121 MG/DL (ref 30–200)
HCT VFR BLD AUTO: 42.4 % (ref 37.5–51)
HGB BLD-MCNC: 13.6 G/DL (ref 13–17.7)
IMM GRANULOCYTES # BLD AUTO: 0 10*3/MM3 (ref 0–0.05)
IMM GRANULOCYTES NFR BLD AUTO: 0 % (ref 0–0.5)
INR PPP: 1.05 (ref 0.89–1.12)
LYMPHOCYTES # BLD AUTO: 1.6 10*3/MM3 (ref 0.7–3.1)
LYMPHOCYTES NFR BLD AUTO: 28.1 % (ref 19.6–45.3)
MCH RBC QN AUTO: 26.4 PG (ref 26.6–33)
MCHC RBC AUTO-ENTMCNC: 32.1 G/DL (ref 31.5–35.7)
MCV RBC AUTO: 82.2 FL (ref 79–97)
MONOCYTES # BLD AUTO: 0.65 10*3/MM3 (ref 0.1–0.9)
MONOCYTES NFR BLD AUTO: 11.4 % (ref 5–12)
NEUTROPHILS NFR BLD AUTO: 3.27 10*3/MM3 (ref 1.7–7)
NEUTROPHILS NFR BLD AUTO: 57.3 % (ref 42.7–76)
PLATELET # BLD AUTO: 108 10*3/MM3 (ref 140–450)
PMV BLD AUTO: 9.1 FL (ref 6–12)
POTASSIUM SERPL-SCNC: 4.1 MMOL/L (ref 3.5–5.2)
PROT SERPL-MCNC: 9 G/DL (ref 6–8.5)
PROTHROMBIN TIME: 14.3 SECONDS (ref 12.2–15.3)
RBC # BLD AUTO: 5.16 10*6/MM3 (ref 4.14–5.8)
RETICS # AUTO: 0.07 10*6/MM3 (ref 0.02–0.13)
RETICS/RBC NFR AUTO: 1.39 % (ref 0.7–1.9)
SODIUM SERPL-SCNC: 139 MMOL/L (ref 136–145)
VIT B12 BLD-MCNC: 1628 PG/ML (ref 211–946)
WBC NRBC COR # BLD AUTO: 5.7 10*3/MM3 (ref 3.4–10.8)

## 2025-04-15 PROCEDURE — 99204 OFFICE O/P NEW MOD 45 MIN: CPT | Performed by: INTERNAL MEDICINE

## 2025-04-15 PROCEDURE — 82607 VITAMIN B-12: CPT

## 2025-04-15 PROCEDURE — 83010 ASSAY OF HAPTOGLOBIN QUANT: CPT

## 2025-04-15 PROCEDURE — 80053 COMPREHEN METABOLIC PANEL: CPT

## 2025-04-15 PROCEDURE — 82728 ASSAY OF FERRITIN: CPT

## 2025-04-15 PROCEDURE — 36415 COLL VENOUS BLD VENIPUNCTURE: CPT

## 2025-04-15 PROCEDURE — 85025 COMPLETE CBC W/AUTO DIFF WBC: CPT

## 2025-04-15 PROCEDURE — 82525 ASSAY OF COPPER: CPT

## 2025-04-15 PROCEDURE — 85730 THROMBOPLASTIN TIME PARTIAL: CPT

## 2025-04-15 PROCEDURE — 82746 ASSAY OF FOLIC ACID SERUM: CPT

## 2025-04-15 PROCEDURE — 85384 FIBRINOGEN ACTIVITY: CPT

## 2025-04-15 PROCEDURE — 85060 BLOOD SMEAR INTERPRETATION: CPT

## 2025-04-15 PROCEDURE — 85045 AUTOMATED RETICULOCYTE COUNT: CPT

## 2025-04-15 PROCEDURE — 85610 PROTHROMBIN TIME: CPT

## 2025-04-15 RX ORDER — SITAGLIPTIN AND METFORMIN HYDROCHLORIDE 1000; 50 MG/1; MG/1
1 TABLET, FILM COATED ORAL
COMMUNITY

## 2025-04-15 RX ORDER — LOSARTAN POTASSIUM AND HYDROCHLOROTHIAZIDE 25; 100 MG/1; MG/1
1 TABLET ORAL DAILY
COMMUNITY
Start: 2025-03-03

## 2025-04-15 RX ORDER — EMPAGLIFLOZIN 25 MG/1
TABLET, FILM COATED ORAL
COMMUNITY

## 2025-04-15 RX ORDER — NADOLOL 20 MG/1
1 TABLET ORAL DAILY
COMMUNITY
Start: 2025-01-29

## 2025-04-15 RX ORDER — TENOFOVIR DISOPROXIL FUMARATE 300 MG/1
300 TABLET, FILM COATED ORAL DAILY
COMMUNITY
Start: 2025-02-21 | End: 2025-05-22

## 2025-04-15 NOTE — PROGRESS NOTES
[facility]  Office number [phone]    Fax number [phone]    New Patient Office Visit      Date: 04/15/2025     Patient Name: Fitz Villalpando  MRN: [id number]  : 1970    Referring Physician: Dr. Heladio Amaya MD    Chief Complaint: thrombocytopenia    History of Present Illness: Fitz Villalpando is a pleasant 55 y.o. male who presents today for evaluation of thrombocytopenia.     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  At that time, he required multiple blood transfusions, however he has not required any transfusions since.     Dr Snowden at [facility] is his gastroenterologist and he is followed closely with upper endoscopies.  Liver ultrasounds, and AFP.  He reports he did not require banding with his last procedure.    He reports being aware of thrombocytopenia for several years.  This is generally monitored by his [facility] gastroenterologist. He has 12 available CBCs in the [facility] system from 2019 through the present, with a platelet count ranging from 110s to low 140s since that time.     He is referred by his PCP for evaluation of thrombocytopenia.      Review of Systems:   I have reviewed the review of systems completed by the patient. This is negative for clinically significant symptoms except as noted below. This document has been scanned into the patient's chart.  Poor appetite, fatigue, trouble swallowing, chest pain, palpitations, swelling of the hands or feet, hypertension, cough, cough with phlegm, depression, shortness of air, abdominal pain, heartburn, diarrhea, constipation, dark stools, back pain, joint pain, muscle pain, generalized weakness, anxiety, depression    Past Medical History:   Past Medical History:   Diagnosis Date    Allergic rhinitis     Anxiety     Diabetes mellitus     ED (erectile dysfunction)     Hypertension     Hypertriglyceridemia        Past Surgical  History:   Past Surgical History:   Procedure Laterality Date    HERNIA REPAIR         Family History:   Family History   Problem Relation Age of Onset    Heart failure Mother     Parkinsonism Father     Multiple sclerosis Sister     Cancer Sister    Sister  of lung cancer at age 57  Paternal grandmother  of cancer in her 50s    Social History:   Social History     Socioeconomic History    Marital status:    Tobacco Use    Smoking status: Every Day     Current packs/day: 1.50     Average packs/day: 1.5 packs/day for 30.0 years (45.0 ttl pk-yrs)     Types: Cigarettes    Smokeless tobacco: Never   Vaping Use    Vaping status: Never Used   Substance and Sexual Activity    Alcohol use: No    Drug use: No    Sexual activity: Defer   Lives in [place], commutes to [place], [place]    Medications:     Current Outpatient Medications:     albuterol (PROAIR RESPICLICK) 108 (90 Base) MCG/ACT inhaler, Inhale 2 puffs Every 6 (Six) Hours As Needed for Wheezing., Disp: 1 inhaler, Rfl: 11    Jardiance 25 MG tablet tablet, TAKE 1 TABLET (25 MG) BY MOUTH DAILY IN THE MORNING WITH BREAK FAST, Disp: , Rfl:     losartan-hydrochlorothiazide (HYZAAR) 100-25 MG per tablet, Take 1 tablet by mouth Daily., Disp: , Rfl:     nadolol (CORGARD) 20 MG tablet, Take 1 tablet by mouth Daily., Disp: , Rfl:     Omega-3 Fatty Acids (FISH OIL) 1000 MG capsule capsule, Take  by mouth Daily With Breakfast., Disp: , Rfl:     sitaGLIPtin-metFORMIN (Janumet)  MG per tablet, Take 1 tablet by mouth., Disp: , Rfl:     tenofovir (VIREAD) 300 MG tablet, Take 1 tablet by mouth Daily., Disp: , Rfl:     atenolol (TENORMIN) 50 MG tablet, Take 1 tablet by mouth Daily., Disp: , Rfl:     fenofibrate (TRICOR) 145 MG tablet, Take 1 tablet by mouth Daily., Disp: , Rfl:     gabapentin (NEURONTIN) 300 MG capsule, Take 1 capsule by mouth 3 (Three) Times a Day., Disp: , Rfl:     losartan (COZAAR) 50 MG tablet, , Disp: , Rfl:     metFORMIN (GLUCOPHAGE) 1000 MG  "tablet, Take 1 tablet by mouth 2 (Two) Times a Day With Meals., Disp: , Rfl:     SILDENAFIL CITRATE PO, Take 50 mg by mouth., Disp: , Rfl:     Allergies:   Allergies   Allergen Reactions    Sulfa Antibiotics Other (See Comments)     Skin infection       Objective     Vital Signs:   Vitals:    04/15/25 1104   BP: 137/73   Pulse: 70   Temp: 97.5 °F (36.4 °C)   TempSrc: Infrared   SpO2: 98%   Weight: 89.8 kg (198 lb)   Height: 188 cm (74\")  Comment: per pt   PainSc: 0-No pain    Body mass index is 25.42 kg/m².   Pain Score    04/15/25 1104   PainSc: 0-No pain       Physical Exam:   General: No acute distress. Well appearing   HEENT: Normocephalic, atraumatic. Sclera anicteric.   Neck: supple, no adenopathy.   Cardiovascular: regular rate and rhythm. No murmurs.   Respiratory: Normal rate. Clear to auscultation bilaterally.  Abdomen: Soft, nontender, non distended with normoactive bowel sounds. No hepatosplenomegaly  Lymph: no cervical, supraclavicular or axillary adenopathy.  Neuro: Alert and oriented x 3. No focal deficits.   Ext: Symmetric, no swelling.     Laboratory/Imaging Reviewed:   Lab on 04/15/2025   Component Date Value Ref Range Status    Glucose 04/15/2025 146 (H)  65 - 99 mg/dL Final    BUN 04/15/2025 16  6 - 20 mg/dL Final    Creatinine 04/15/2025 0.79  0.76 - 1.27 mg/dL Final    Sodium 04/15/2025 139  136 - 145 mmol/L Final    Potassium 04/15/2025 4.1  3.5 - 5.2 mmol/L Final    Chloride 04/15/2025 101  98 - 107 mmol/L Final    CO2 04/15/2025 26.0  22.0 - 29.0 mmol/L Final    Calcium 04/15/2025 10.6 (H)  8.6 - 10.5 mg/dL Final    Total Protein 04/15/2025 9.0 (H)  6.0 - 8.5 g/dL Final    Albumin 04/15/2025 4.6  3.5 - 5.2 g/dL Final    ALT (SGPT) 04/15/2025 56 (H)  1 - 41 U/L Final    AST (SGOT) 04/15/2025 46 (H)  1 - 40 U/L Final    Alkaline Phosphatase 04/15/2025 75  39 - 117 U/L Final    Total Bilirubin 04/15/2025 0.5  0.0 - 1.2 mg/dL Final    Globulin 04/15/2025 4.4  gm/dL Final    Calculated Result    " A/G Ratio 04/15/2025 1.0  g/dL Final    BUN/Creatinine Ratio 04/15/2025 20.3  7.0 - 25.0 Final    Anion Gap 04/15/2025 12.0  5.0 - 15.0 mmol/L Final    eGFR 04/15/2025 106.2  >60.0 mL/min/1.73 Final    Reticulocyte % 04/15/2025 1.39  0.70 - 1.90 % Final    Reticulocyte Absolute 04/15/2025 0.0727  0.0200 - 0.1300 10*6/mm3 Final    Haptoglobin 04/15/2025 121  30 - 200 mg/dL Final    Performed by: 04/15/2025 Kirill Barth MD   Final    Pathologist Interpretation 04/15/2025    Final                    Value:Normal total white blood cell count with differential compatible with the differential reported.  No abnormal/immature white blood cells noted.  Normochromic normocytic red blood cells without anemia.  Thrombocytopenia with normal platelet appearance.  No platelet clumping identified.    Ferritin 04/15/2025 10.26 (L)  30.00 - 400.00 ng/mL Final    Vitamin B-12 04/15/2025 1,628 (H)  211 - 946 pg/mL Final    Folate 04/15/2025 >20.00  4.78 - 24.20 ng/mL Final    Copper 04/15/2025 105  69 - 132 ug/dL Final                                    Detection Limit = 5    Fibrinogen 04/15/2025 354  203 - 567 mg/dL Final    Protime 04/15/2025 14.3  12.2 - 15.3 Seconds Final    INR 04/15/2025 1.05  0.89 - 1.12 Final    PTT 04/15/2025 31.2  22.0 - 39.0 seconds Final    WBC 04/15/2025 5.70  3.40 - 10.80 10*3/mm3 Final    RBC 04/15/2025 5.16  4.14 - 5.80 10*6/mm3 Final    Hemoglobin 04/15/2025 13.6  13.0 - 17.7 g/dL Final    Hematocrit 04/15/2025 42.4  37.5 - 51.0 % Final    MCV 04/15/2025 82.2  79.0 - 97.0 fL Final    MCH 04/15/2025 26.4 (L)  26.6 - 33.0 pg Final    MCHC 04/15/2025 32.1  31.5 - 35.7 g/dL Final    RDW 04/15/2025 16.1 (H)  12.3 - 15.4 % Final    RDW-SD 04/15/2025 48.9  37.0 - 54.0 fl Final    MPV 04/15/2025 9.1  6.0 - 12.0 fL Final    Platelets 04/15/2025 108 (L)  140 - 450 10*3/mm3 Final    Neutrophil % 04/15/2025 57.3  42.7 - 76.0 % Final    Lymphocyte % 04/15/2025 28.1  19.6 - 45.3 % Final    Monocyte % 04/15/2025  11.4  5.0 - 12.0 % Final    Eosinophil % 04/15/2025 3.0  0.3 - 6.2 % Final    Basophil % 04/15/2025 0.2  0.0 - 1.5 % Final    Immature Grans % 04/15/2025 0.0  0.0 - 0.5 % Final    Neutrophils, Absolute 04/15/2025 3.27  1.70 - 7.00 10*3/mm3 Final    Lymphocytes, Absolute 04/15/2025 1.60  0.70 - 3.10 10*3/mm3 Final    Monocytes, Absolute 04/15/2025 0.65  0.10 - 0.90 10*3/mm3 Final    Eosinophils, Absolute 04/15/2025 0.17  0.00 - 0.40 10*3/mm3 Final    Basophils, Absolute 04/15/2025 0.01  0.00 - 0.20 10*3/mm3 Final    Immature Grans, Absolute 04/15/2025 0.00  0.00 - 0.05 10*3/mm3 Final       No results found.    Assessment / Plan      Assessment/Plan:   Thrombocytopenia  Cirrhosis secondary to hepatitis B with portal hypertension  -I reviewed greater then 20 available blood counts from multiple health systems dating back to 2019.  I reviewed his most recent gastroenterology note and upper endoscopy results.  - He has a history of hepatitis B cirrhosis with known portal hypertension and esophageal varices.  -He has stable thrombocytopenia since at least 2019 which is consistent with his history of portal hypertension.  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  Given overall stability over many years, this can likely continue to be monitored with his gastroenterologist.  ITP is unlikely given alternative explanation identified.  -I have ordered additional blood work and a ultrasound as below to evaluate for other compounding causes.  Orders Placed This Encounter   Procedures    US Spleen    Comprehensive Metabolic Panel    Reticulocytes    Haptoglobin    Ferritin    Vitamin B12    Folate    Copper, Serum    Fibrinogen    Protime-INR    aPTT    CBC & Differential   These are notable for adequate B12, folate, copper, normal fibrinogen, PTT, and INR, relatively normal peripheral blood smear with no platelet clumping.  Mild reduction in ferritin.  - Patient will be contacted to recommend that he start an oral iron supplements.  He  should update his colonoscopy if he is not up-to-date with his established gastroenterologist.  He can follow-up in 3 months with my nurse practitioner to ensure that his iron stores are adequately replete.  Anticipate continued periodic CBC monitoring with his gastroenterologist, such that he may not require long-term hematology follow-up    Follow Up:   3 mo NP     Amanda Kurtz MD  Hematology and Oncology

## 2025-04-15 NOTE — LETTER
2025     Heladio Amaya MD  [address] [place] [address]    Patient: Fitz Villalpando   YOB: 1970   Date of Visit: 4/15/2025     Dear Heladio Amaay MD:       Thank you for referring Fitz Villalpando to me for evaluation. Below are the relevant portions of my assessment and plan of care.    If you have questions, please do not hesitate to call me. I look forward to following Fitz along with you.         Sincerely,        Amanda Kurtz MD        CC: No Recipients    Amanda Kurtz MD  25 [id number]  Sign when Signing Visit       [facility] [place]  Office number [phone]    Fax number [phone]    New Patient Office Visit      Date: 04/15/2025     Patient Name: Fitz Villalpando  MRN: [id number]  : 1970    Referring Physician: Dr. Heladio Amaya MD    Chief Complaint: thrombocytopenia    History of Present Illness: Fitz Villalpando is a pleasant 55 y.o. male who presents today for evaluation of thrombocytopenia.     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  At that time, he required multiple blood transfusions, however he has not required any transfusions since.     Dr Snowden at [facility] is his gastroenterologist and he is followed closely with upper endoscopies.  Liver ultrasounds, and AFP.  He reports he did not require banding with his last procedure.    He reports being aware of thrombocytopenia for several years.  This is generally monitored by his [facility] gastroenterologist. He has 12 available CBCs in the [facility] system from 2019 through the present, with a platelet count ranging from 110s to low 140s since that time.     He is referred by his PCP for evaluation of thrombocytopenia.      Review of Systems:   I have reviewed the review of systems completed by the patient. This is negative for clinically significant symptoms except as noted  below. This document has been scanned into the patient's chart.  Poor appetite, fatigue, trouble swallowing, chest pain, palpitations, swelling of the hands or feet, hypertension, cough, cough with phlegm, depression, shortness of air, abdominal pain, heartburn, diarrhea, constipation, dark stools, back pain, joint pain, muscle pain, generalized weakness, anxiety, depression    Past Medical History:   Past Medical History:   Diagnosis Date   • Allergic rhinitis    • Anxiety    • Diabetes mellitus    • ED (erectile dysfunction)    • Hypertension    • Hypertriglyceridemia        Past Surgical History:   Past Surgical History:   Procedure Laterality Date   • HERNIA REPAIR         Family History:   Family History   Problem Relation Age of Onset   • Heart failure Mother    • Parkinsonism Father    • Multiple sclerosis Sister    • Cancer Sister    Sister  of lung cancer at age 57  Paternal grandmother  of cancer in her 50s    Social History:   Social History     Socioeconomic History   • Marital status:    Tobacco Use   • Smoking status: Every Day     Current packs/day: 1.50     Average packs/day: 1.5 packs/day for 30.0 years (45.0 ttl pk-yrs)     Types: Cigarettes   • Smokeless tobacco: Never   Vaping Use   • Vaping status: Never Used   Substance and Sexual Activity   • Alcohol use: No   • Drug use: No   • Sexual activity: Defer   Lives in [place], commutes to [place], [place]    Medications:     Current Outpatient Medications:   •  albuterol (PROAIR RESPICLICK) 108 (90 Base) MCG/ACT inhaler, Inhale 2 puffs Every 6 (Six) Hours As Needed for Wheezing., Disp: 1 inhaler, Rfl: 11  •  Jardiance 25 MG tablet tablet, TAKE 1 TABLET (25 MG) BY MOUTH DAILY IN THE MORNING WITH BREAK FAST, Disp: , Rfl:   •  losartan-hydrochlorothiazide (HYZAAR) 100-25 MG per tablet, Take 1 tablet by mouth Daily., Disp: , Rfl:   •  nadolol (CORGARD) 20 MG tablet, Take 1 tablet by mouth Daily., Disp: , Rfl:   •  Omega-3 Fatty Acids (FISH OIL)  "1000 MG capsule capsule, Take  by mouth Daily With Breakfast., Disp: , Rfl:   •  sitaGLIPtin-metFORMIN (Janumet)  MG per tablet, Take 1 tablet by mouth., Disp: , Rfl:   •  tenofovir (VIREAD) 300 MG tablet, Take 1 tablet by mouth Daily., Disp: , Rfl:   •  atenolol (TENORMIN) 50 MG tablet, Take 1 tablet by mouth Daily., Disp: , Rfl:   •  fenofibrate (TRICOR) 145 MG tablet, Take 1 tablet by mouth Daily., Disp: , Rfl:   •  gabapentin (NEURONTIN) 300 MG capsule, Take 1 capsule by mouth 3 (Three) Times a Day., Disp: , Rfl:   •  losartan (COZAAR) 50 MG tablet, , Disp: , Rfl:   •  metFORMIN (GLUCOPHAGE) 1000 MG tablet, Take 1 tablet by mouth 2 (Two) Times a Day With Meals., Disp: , Rfl:   •  SILDENAFIL CITRATE PO, Take 50 mg by mouth., Disp: , Rfl:     Allergies:   Allergies   Allergen Reactions   • Sulfa Antibiotics Other (See Comments)     Skin infection       Objective     Vital Signs:   Vitals:    04/15/25 1104   BP: 137/73   Pulse: 70   Temp: 97.5 °F (36.4 °C)   TempSrc: Infrared   SpO2: 98%   Weight: 89.8 kg (198 lb)   Height: 188 cm (74\")  Comment: per pt   PainSc: 0-No pain    Body mass index is 25.42 kg/m².   Pain Score    04/15/25 1104   PainSc: 0-No pain       Physical Exam:   General: No acute distress. Well appearing   HEENT: Normocephalic, atraumatic. Sclera anicteric.   Neck: supple, no adenopathy.   Cardiovascular: regular rate and rhythm. No murmurs.   Respiratory: Normal rate. Clear to auscultation bilaterally.  Abdomen: Soft, nontender, non distended with normoactive bowel sounds. No hepatosplenomegaly  Lymph: no cervical, supraclavicular or axillary adenopathy.  Neuro: Alert and oriented x 3. No focal deficits.   Ext: Symmetric, no swelling.     Laboratory/Imaging Reviewed:   Lab on 04/15/2025   Component Date Value Ref Range Status   • Glucose 04/15/2025 146 (H)  65 - 99 mg/dL Final   • BUN 04/15/2025 16  6 - 20 mg/dL Final   • Creatinine 04/15/2025 0.79  0.76 - 1.27 mg/dL Final   • Sodium " 04/15/2025 139  136 - 145 mmol/L Final   • Potassium 04/15/2025 4.1  3.5 - 5.2 mmol/L Final   • Chloride 04/15/2025 101  98 - 107 mmol/L Final   • CO2 04/15/2025 26.0  22.0 - 29.0 mmol/L Final   • Calcium 04/15/2025 10.6 (H)  8.6 - 10.5 mg/dL Final   • Total Protein 04/15/2025 9.0 (H)  6.0 - 8.5 g/dL Final   • Albumin 04/15/2025 4.6  3.5 - 5.2 g/dL Final   • ALT (SGPT) 04/15/2025 56 (H)  1 - 41 U/L Final   • AST (SGOT) 04/15/2025 46 (H)  1 - 40 U/L Final   • Alkaline Phosphatase 04/15/2025 75  39 - 117 U/L Final   • Total Bilirubin 04/15/2025 0.5  0.0 - 1.2 mg/dL Final   • Globulin 04/15/2025 4.4  gm/dL Final    Calculated Result   • A/G Ratio 04/15/2025 1.0  g/dL Final   • BUN/Creatinine Ratio 04/15/2025 20.3  7.0 - 25.0 Final   • Anion Gap 04/15/2025 12.0  5.0 - 15.0 mmol/L Final   • eGFR 04/15/2025 106.2  >60.0 mL/min/1.73 Final   • Reticulocyte % 04/15/2025 1.39  0.70 - 1.90 % Final   • Reticulocyte Absolute 04/15/2025 0.0727  0.0200 - 0.1300 10*6/mm3 Final   • Haptoglobin 04/15/2025 121  30 - 200 mg/dL Final   • Performed by: 04/15/2025 Kirill Barth MD   Final   • Pathologist Interpretation 04/15/2025    Final                    Value:Normal total white blood cell count with differential compatible with the differential reported.  No abnormal/immature white blood cells noted.  Normochromic normocytic red blood cells without anemia.  Thrombocytopenia with normal platelet appearance.  No platelet clumping identified.   • Ferritin 04/15/2025 10.26 (L)  30.00 - 400.00 ng/mL Final   • Vitamin B-12 04/15/2025 1,628 (H)  211 - 946 pg/mL Final   • Folate 04/15/2025 >20.00  4.78 - 24.20 ng/mL Final   • Copper 04/15/2025 105  69 - 132 ug/dL Final                                    Detection Limit = 5   • Fibrinogen 04/15/2025 354  203 - 567 mg/dL Final   • Protime 04/15/2025 14.3  12.2 - 15.3 Seconds Final   • INR 04/15/2025 1.05  0.89 - 1.12 Final   • PTT 04/15/2025 31.2  22.0 - 39.0 seconds Final   • WBC 04/15/2025  5.70  3.40 - 10.80 10*3/mm3 Final   • RBC 04/15/2025 5.16  4.14 - 5.80 10*6/mm3 Final   • Hemoglobin 04/15/2025 13.6  13.0 - 17.7 g/dL Final   • Hematocrit 04/15/2025 42.4  37.5 - 51.0 % Final   • MCV 04/15/2025 82.2  79.0 - 97.0 fL Final   • MCH 04/15/2025 26.4 (L)  26.6 - 33.0 pg Final   • MCHC 04/15/2025 32.1  31.5 - 35.7 g/dL Final   • RDW 04/15/2025 16.1 (H)  12.3 - 15.4 % Final   • RDW-SD 04/15/2025 48.9  37.0 - 54.0 fl Final   • MPV 04/15/2025 9.1  6.0 - 12.0 fL Final   • Platelets 04/15/2025 108 (L)  140 - 450 10*3/mm3 Final   • Neutrophil % 04/15/2025 57.3  42.7 - 76.0 % Final   • Lymphocyte % 04/15/2025 28.1  19.6 - 45.3 % Final   • Monocyte % 04/15/2025 11.4  5.0 - 12.0 % Final   • Eosinophil % 04/15/2025 3.0  0.3 - 6.2 % Final   • Basophil % 04/15/2025 0.2  0.0 - 1.5 % Final   • Immature Grans % 04/15/2025 0.0  0.0 - 0.5 % Final   • Neutrophils, Absolute 04/15/2025 3.27  1.70 - 7.00 10*3/mm3 Final   • Lymphocytes, Absolute 04/15/2025 1.60  0.70 - 3.10 10*3/mm3 Final   • Monocytes, Absolute 04/15/2025 0.65  0.10 - 0.90 10*3/mm3 Final   • Eosinophils, Absolute 04/15/2025 0.17  0.00 - 0.40 10*3/mm3 Final   • Basophils, Absolute 04/15/2025 0.01  0.00 - 0.20 10*3/mm3 Final   • Immature Grans, Absolute 04/15/2025 0.00  0.00 - 0.05 10*3/mm3 Final       No results found.    Assessment / Plan      Assessment/Plan:   Thrombocytopenia  Cirrhosis secondary to hepatitis B with portal hypertension  -I reviewed greater then 20 available blood counts from multiple health systems dating back to 2019.  I reviewed his most recent gastroenterology note and upper endoscopy results.  - He has a history of hepatitis B cirrhosis with known portal hypertension and esophageal varices.  -He has stable thrombocytopenia since at least 2019 which is consistent with his history of portal hypertension.  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  Given overall stability over many years, this can likely continue to be monitored with his  gastroenterologist.  ITP is unlikely given alternative explanation identified.  -I have ordered additional blood work and a ultrasound as below to evaluate for other compounding causes.  Orders Placed This Encounter   Procedures   • US Spleen   • Comprehensive Metabolic Panel   • Reticulocytes   • Haptoglobin   • Ferritin   • Vitamin B12   • Folate   • Copper, Serum   • Fibrinogen   • Protime-INR   • aPTT   • CBC & Differential   These are notable for adequate B12, folate, copper, normal fibrinogen, PTT, and INR, relatively normal peripheral blood smear with no platelet clumping.  Mild reduction in ferritin.  - Patient will be contacted to recommend that he start an oral iron supplements.  He should update his colonoscopy if he is not up-to-date with his established gastroenterologist.  He can follow-up in 3 months with my nurse practitioner to ensure that his iron stores are adequately replete.  Anticipate continued periodic CBC monitoring with his gastroenterologist, such that he may not require long-term hematology follow-up    Follow Up:   3 mo NP     Amanda Kurtz MD  Hematology and Oncology

## 2025-04-16 ENCOUNTER — HOSPITAL ENCOUNTER (OUTPATIENT)
Dept: ULTRASOUND IMAGING | Facility: HOSPITAL | Age: 55
Discharge: HOME | End: 2025-04-16
Admitting: INTERNAL MEDICINE
Payer: COMMERCIAL

## 2025-04-16 DIAGNOSIS — D69.6 THROMBOCYTOPENIA: ICD-10-CM

## 2025-04-16 LAB
CYTOLOGIST CVX/VAG CYTO: NORMAL
PATH INTERP BLD-IMP: NORMAL

## 2025-04-16 PROCEDURE — 76705 ECHO EXAM OF ABDOMEN: CPT

## 2025-04-18 ENCOUNTER — PATIENT MESSAGE (OUTPATIENT)
Dept: ONCOLOGY | Facility: CLINIC | Age: 55
End: 2025-04-18
Payer: COMMERCIAL

## 2025-04-18 LAB — COPPER SERPL-MCNC: 105 UG/DL (ref 69–132)

## 2025-07-22 ENCOUNTER — LAB (OUTPATIENT)
Dept: LAB | Facility: HOSPITAL | Age: 55
End: 2025-07-22
Payer: COMMERCIAL

## 2025-07-22 ENCOUNTER — OFFICE VISIT (OUTPATIENT)
Dept: ONCOLOGY | Facility: CLINIC | Age: 55
End: 2025-07-22
Payer: COMMERCIAL

## 2025-07-22 VITALS
TEMPERATURE: 98.2 F | BODY MASS INDEX: 25.78 KG/M2 | WEIGHT: 200.9 LBS | HEIGHT: 74 IN | DIASTOLIC BLOOD PRESSURE: 62 MMHG | RESPIRATION RATE: 16 BRPM | HEART RATE: 86 BPM | SYSTOLIC BLOOD PRESSURE: 111 MMHG | OXYGEN SATURATION: 98 %

## 2025-07-22 DIAGNOSIS — D69.6 THROMBOCYTOPENIA: Primary | ICD-10-CM

## 2025-07-22 DIAGNOSIS — D69.6 THROMBOCYTOPENIA: ICD-10-CM

## 2025-07-22 LAB
BASOPHILS # BLD AUTO: 0.02 10*3/MM3 (ref 0–0.2)
BASOPHILS NFR BLD AUTO: 0.4 % (ref 0–1.5)
DEPRECATED RDW RBC AUTO: 50.2 FL (ref 37–54)
EOSINOPHIL # BLD AUTO: 0.17 10*3/MM3 (ref 0–0.4)
EOSINOPHIL NFR BLD AUTO: 3.2 % (ref 0.3–6.2)
ERYTHROCYTE [DISTWIDTH] IN BLOOD BY AUTOMATED COUNT: 15.2 % (ref 12.3–15.4)
FERRITIN SERPL-MCNC: 26 NG/ML (ref 30–400)
HCT VFR BLD AUTO: 42.3 % (ref 37.5–51)
HGB BLD-MCNC: 14 G/DL (ref 13–17.7)
IMM GRANULOCYTES # BLD AUTO: 0.01 10*3/MM3 (ref 0–0.05)
IMM GRANULOCYTES NFR BLD AUTO: 0.2 % (ref 0–0.5)
LYMPHOCYTES # BLD AUTO: 1.79 10*3/MM3 (ref 0.7–3.1)
LYMPHOCYTES NFR BLD AUTO: 33.4 % (ref 19.6–45.3)
MCH RBC QN AUTO: 29.2 PG (ref 26.6–33)
MCHC RBC AUTO-ENTMCNC: 33.1 G/DL (ref 31.5–35.7)
MCV RBC AUTO: 88.3 FL (ref 79–97)
MONOCYTES # BLD AUTO: 0.61 10*3/MM3 (ref 0.1–0.9)
MONOCYTES NFR BLD AUTO: 11.4 % (ref 5–12)
NEUTROPHILS NFR BLD AUTO: 2.76 10*3/MM3 (ref 1.7–7)
NEUTROPHILS NFR BLD AUTO: 51.4 % (ref 42.7–76)
PLATELET # BLD AUTO: 94 10*3/MM3 (ref 140–450)
PMV BLD AUTO: 9.6 FL (ref 6–12)
RBC # BLD AUTO: 4.79 10*6/MM3 (ref 4.14–5.8)
WBC NRBC COR # BLD AUTO: 5.36 10*3/MM3 (ref 3.4–10.8)

## 2025-07-22 PROCEDURE — 85025 COMPLETE CBC W/AUTO DIFF WBC: CPT

## 2025-07-22 PROCEDURE — 36415 COLL VENOUS BLD VENIPUNCTURE: CPT

## 2025-07-22 PROCEDURE — 82728 ASSAY OF FERRITIN: CPT

## 2025-07-22 PROCEDURE — 99214 OFFICE O/P EST MOD 30 MIN: CPT | Performed by: NURSE PRACTITIONER

## 2025-07-22 NOTE — PROGRESS NOTES
"CHIEF COMPLAINT: follow up thrombocytopenia    History of Present Illness: Fitz Villalpando is a pleasant 55 y.o. male who presents today for evaluation of thrombocytopenia.      The patient reports a history of hepatitis B associated cirrhosis complicated by esophageal varices and a Radha-Jeff tear in 2022.  At that time, he required multiple blood transfusions, however he has not required any transfusions since.      Dr Snowden at [facility] is his gastroenterologist and he is followed closely with upper endoscopies.  Liver ultrasounds, and AFP.  He reports he did not require banding with his last procedure.     He reports being aware of thrombocytopenia for several years.  This is generally monitored by his [facility] gastroenterologist. He has 12 available CBCs in the [facility] system from 2019 through the present, with a platelet count ranging from 110s to low 140s since that time.      He is referred by his PCP for evaluation of thrombocytopenia.    Interval history  Overall feels good.  Scheduled for EGD tomorrow with Dr. Dash.  Has not had colonoscopy for several years, cannot remember date of his last one.  No bleeding or unusual bruising.  He has occasional constipation with his iron but takes it about 5 out 7 days.      Past Medical History:   Diagnosis Date    Allergic rhinitis     Anxiety     Diabetes mellitus     ED (erectile dysfunction)     Hypertension     Hypertriglyceridemia      Past Surgical History:   Procedure Laterality Date    HERNIA REPAIR       Allergies   Allergen Reactions    Sulfa Antibiotics Other (See Comments)     Skin infection     Family History and Social History reviewed and changed as necessary     PHYSICAL EXAM    Vitals:    07/22/25 1404   BP: 111/62   Pulse: 86   Resp: 16   Temp: 98.2 °F (36.8 °C)   TempSrc: Temporal   SpO2: 98%   Weight: 91.1 kg (200 lb 14.4 oz)   Height: 188 cm (74.02\")     ECOG score: 0         Vitals:    07/22/25 1404   PainSc: 0-No pain        General: No acute distress. " Well appearing   HEENT: Normocephalic, atraumatic. Sclera anicteric.   Neck: supple, no adenopathy.   Cardiovascular: regular rate and rhythm. No murmurs.   Respiratory: Normal rate. Clear to auscultation bilaterally.  Abdomen: Soft, nontender, non distended with normoactive bowel sounds.   Lymph: no cervical, supraclavicular or axillary adenopathy.  Neuro: Alert and oriented x 3. No focal deficits.   Ext: Symmetric, no swelling.   Vitals reviewed.    Lab Results   Component Value Date    HGB 14.0 07/22/2025    HCT 42.3 07/22/2025    MCV 88.3 07/22/2025    PLT 94 (L) 07/22/2025    WBC 5.36 07/22/2025    NEUTROABS 2.76 07/22/2025    LYMPHSABS 1.79 07/22/2025    MONOSABS 0.61 07/22/2025    EOSABS 0.17 07/22/2025    BASOSABS 0.02 07/22/2025       Lab Results   Component Value Date    GLUCOSE 146 (H) 04/15/2025    BUN 16 04/15/2025    CREATININE 0.79 04/15/2025     04/15/2025    K 4.1 04/15/2025     04/15/2025    CO2 26.0 04/15/2025    CALCIUM 10.6 (H) 04/15/2025    PROTEINTOT 9.0 (H) 04/15/2025    ALBUMIN 4.6 04/15/2025    BILITOT 0.5 04/15/2025    ALKPHOS 75 04/15/2025    AST 46 (H) 04/15/2025    ALT 56 (H) 04/15/2025     Lab Results   Component Value Date    FERRITIN 26.00 (L) 07/22/2025    FERRITIN 10.26 (L) 04/15/2025       ASSESSMENT & PLAN:  Thrombocytopenia  Cirrhosis secondary to hepatitis B with portal hypertension  -He has a history of hepatitis B cirrhosis with known portal hypertension and esophageal varices.  -He has stable thrombocytopenia since at least 2019 which is consistent with his history of portal hypertension.  Given overall stability over many years, this can likely continue to be monitored with his gastroenterologist.  ITP is unlikely given alternative explanation identified.  -Recent workup notable for adequate B12, folate, copper, normal fibrinogen, PTT, and INR, relatively normal peripheral blood smear with no platelet clumping.  Mild reduction in ferritin.  -Patient is has been  taking oral iron for about 3 months.  He does cause him some constipation but he takes it consistently 5 out of 7 days.  Repeat ferritin today improved but still slightly decreased at 26.  CBC is normal.  He will continue iron daily as tolerated.  He is scheduled for EGD tomorrow to evaluate esophageal varices.  He is due for repeat colonoscopy which he will discuss with his gastroenterologist.    -Platelet count slightly decreased at 94 but overall stable.  We will see him back in 3 months with repeat labs.  If adequate will defer to periodic CBC monitoring with his gastroenterologist or PCP as he would not require long-term hematology follow-up.    Zhane Abarca, APRN    07/22/2025